# Patient Record
Sex: FEMALE | Race: BLACK OR AFRICAN AMERICAN | Employment: FULL TIME | URBAN - METROPOLITAN AREA
[De-identification: names, ages, dates, MRNs, and addresses within clinical notes are randomized per-mention and may not be internally consistent; named-entity substitution may affect disease eponyms.]

---

## 2022-08-04 ENCOUNTER — APPOINTMENT (OUTPATIENT)
Dept: GENERAL RADIOLOGY | Age: 57
End: 2022-08-04
Attending: STUDENT IN AN ORGANIZED HEALTH CARE EDUCATION/TRAINING PROGRAM
Payer: OTHER GOVERNMENT

## 2022-08-04 ENCOUNTER — HOSPITAL ENCOUNTER (EMERGENCY)
Age: 57
Discharge: HOME | End: 2022-08-04
Attending: STUDENT IN AN ORGANIZED HEALTH CARE EDUCATION/TRAINING PROGRAM
Payer: OTHER GOVERNMENT

## 2022-08-04 VITALS
WEIGHT: 213 LBS | TEMPERATURE: 98.3 F | SYSTOLIC BLOOD PRESSURE: 125 MMHG | RESPIRATION RATE: 20 BRPM | HEART RATE: 79 BPM | DIASTOLIC BLOOD PRESSURE: 70 MMHG | OXYGEN SATURATION: 100 % | BODY MASS INDEX: 32.28 KG/M2 | HEIGHT: 68 IN

## 2022-08-04 DIAGNOSIS — S46.912A STRAIN OF LEFT SHOULDER, INITIAL ENCOUNTER: Primary | ICD-10-CM

## 2022-08-04 PROCEDURE — 99283 EMERGENCY DEPT VISIT LOW MDM: CPT

## 2022-08-04 PROCEDURE — 73030 X-RAY EXAM OF SHOULDER: CPT

## 2022-08-04 RX ORDER — METHOCARBAMOL 500 MG/1
500 TABLET, FILM COATED ORAL 4 TIMES DAILY
Qty: 20 TABLET | Refills: 0 | Status: SHIPPED | OUTPATIENT
Start: 2022-08-04 | End: 2022-08-09

## 2022-08-04 RX ORDER — NAPROXEN 500 MG/1
500 TABLET ORAL 2 TIMES DAILY WITH MEALS
Qty: 20 TABLET | Refills: 0 | Status: SHIPPED | OUTPATIENT
Start: 2022-08-04 | End: 2022-08-14

## 2022-08-04 NOTE — ED NOTES
Pt provided discharge instructions, prescriptions, education and follow up information. Pt verbalizing understanding. Pt A&Ox4, RA. Pain controlled. Patient ambulatory out of ER.

## 2022-08-04 NOTE — ED PROVIDER NOTES
Patient is a 14-year-old female presenting today with left shoulder pain. She reports that 3 days ago she was lifting weights above her head and shortly after started with pain in the posterior shoulder radiating down the triceps region. It is an aching pain. It is worse with abduction of the shoulder. Sometimes she gets relief when she fully elevates the shoulder above her head. No numbness or weakness. She has not take anything for pain today. Past Medical History:   Diagnosis Date    Anemia     Hypertension        Past Surgical History:   Procedure Laterality Date    HX GYN  2009             No family history on file. Social History     Socioeconomic History    Marital status:      Spouse name: Not on file    Number of children: Not on file    Years of education: Not on file    Highest education level: Not on file   Occupational History    Not on file   Tobacco Use    Smoking status: Never    Smokeless tobacco: Never   Substance and Sexual Activity    Alcohol use: No    Drug use: No    Sexual activity: Not on file   Other Topics Concern    Not on file   Social History Narrative    Not on file     Social Determinants of Health     Financial Resource Strain: Not on file   Food Insecurity: Not on file   Transportation Needs: Not on file   Physical Activity: Not on file   Stress: Not on file   Social Connections: Not on file   Intimate Partner Violence: Not on file   Housing Stability: Not on file         ALLERGIES: Patient has no known allergies. Review of Systems   Musculoskeletal:  Positive for arthralgias. All other systems reviewed and are negative. Vitals:    22 1453   BP: 125/70   Pulse: 79   Resp: 20   Temp: 98.3 °F (36.8 °C)   SpO2: 100%   Weight: 96.6 kg (213 lb)   Height: 5' 8\" (1.727 m)            Physical Exam  Constitutional:       General: She is not in acute distress. Appearance: She is well-developed. HENT:      Head: Normocephalic.    Eyes: Conjunctiva/sclera: Conjunctivae normal.   Pulmonary:      Effort: Pulmonary effort is normal. No respiratory distress. Musculoskeletal:         General: Normal range of motion. Cervical back: Normal range of motion. Comments: Left upper extremity: Palpable radial pulse. Motor and sensory intact in the hand. No reproducible tenderness however with abduction of the shoulder pain is recreated. Skin:     General: Skin is warm and dry. Capillary Refill: Capillary refill takes less than 2 seconds. Psychiatric:         Behavior: Behavior normal.        MDM         Procedures    X-ray negative     well-appearing 28-year-old female here today with left shoulder pain after lifting weights above her head 3 days ago. And with AB duction, suspicious for rotator cuff strain. Neurovascular intact. Will opt to not give shoulder immobilizer so that she does not require adhesive capsulitis. I will give her Robaxin and Naprosyn. To follow-up with orthopedics.       ICD-10-CM ICD-9-CM    1. Strain of left shoulder, initial encounter  A16.475Y 840.9         DC  Ba Chase,

## 2025-03-25 NOTE — ED TRIAGE NOTES
Patient reports L shoulder pain that began Monday morning when strained her neck and back while lifting weights. Denies new numbness or weakness . States pain radiates to forearm. Denies CP or SOB. No